# Patient Record
Sex: FEMALE | Race: ASIAN | NOT HISPANIC OR LATINO | URBAN - METROPOLITAN AREA
[De-identification: names, ages, dates, MRNs, and addresses within clinical notes are randomized per-mention and may not be internally consistent; named-entity substitution may affect disease eponyms.]

---

## 2021-05-06 ENCOUNTER — APPOINTMENT (RX ONLY)
Dept: URBAN - METROPOLITAN AREA CLINIC 12 | Facility: CLINIC | Age: 46
Setting detail: DERMATOLOGY
End: 2021-05-06

## 2021-05-06 DIAGNOSIS — Z41.9 ENCOUNTER FOR PROCEDURE FOR PURPOSES OTHER THAN REMEDYING HEALTH STATE, UNSPECIFIED: ICD-10-CM

## 2021-05-06 PROCEDURE — ? CONSULTATION - COMPREHENSIVE FACIAL ANALYSIS

## 2021-05-06 NOTE — PROCEDURE: CONSULTATION - COMPREHENSIVE FACIAL ANALYSIS
Detail Level: Detailed
Malar Ptosis?: No
Lower Lip: Normal
Realistic Expectations?: Yes
Mentocervical Angle: Obtuse
Occlusion: Class I
Lip Commissure Positions: Level